# Patient Record
Sex: MALE | Race: BLACK OR AFRICAN AMERICAN | Employment: OTHER | URBAN - METROPOLITAN AREA
[De-identification: names, ages, dates, MRNs, and addresses within clinical notes are randomized per-mention and may not be internally consistent; named-entity substitution may affect disease eponyms.]

---

## 2022-04-15 ENCOUNTER — TRANSCRIBE ORDER (OUTPATIENT)
Dept: SCHEDULING | Age: 62
End: 2022-04-15

## 2022-04-15 DIAGNOSIS — R93.89 ABNORMAL RADIOLOGICAL FINDINGS IN SKIN AND SUBCUTANEOUS TISSUE: Primary | ICD-10-CM

## 2022-04-26 ENCOUNTER — HOSPITAL ENCOUNTER (OUTPATIENT)
Dept: CT IMAGING | Age: 62
Discharge: HOME | End: 2022-04-26
Attending: FAMILY MEDICINE

## 2022-04-26 DIAGNOSIS — R93.89 ABNORMAL RADIOLOGICAL FINDINGS IN SKIN AND SUBCUTANEOUS TISSUE: ICD-10-CM

## 2022-04-26 RX ORDER — SODIUM CHLORIDE 0.9 % (FLUSH) 0.9 %
10 SYRINGE (ML) INJECTION
Status: COMPLETED | OUTPATIENT
Start: 2022-04-26 | End: 2022-04-26

## 2022-04-26 RX ADMIN — Medication 10 ML: at 10:04

## 2022-06-03 ENCOUNTER — OFFICE VISIT (OUTPATIENT)
Dept: ENT CLINIC | Age: 62
End: 2022-06-03
Payer: OTHER GOVERNMENT

## 2022-06-03 VITALS — HEIGHT: 72 IN | RESPIRATION RATE: 22 BRPM | BODY MASS INDEX: 42.66 KG/M2 | WEIGHT: 315 LBS

## 2022-06-03 DIAGNOSIS — J32.8 OTHER CHRONIC SINUSITIS: Primary | ICD-10-CM

## 2022-06-03 DIAGNOSIS — R09.81 NASAL CONGESTION: ICD-10-CM

## 2022-06-03 PROCEDURE — 31231 NASAL ENDOSCOPY DX: CPT | Performed by: OTOLARYNGOLOGY

## 2022-06-03 PROCEDURE — 99204 OFFICE O/P NEW MOD 45 MIN: CPT | Performed by: OTOLARYNGOLOGY

## 2022-06-03 RX ORDER — POLYETHYLENE GLYCOL-3350 AND ELECTROLYTES 236; 6.74; 5.86; 2.97; 22.74 G/274.31G; G/274.31G; G/274.31G; G/274.31G; G/274.31G
POWDER, FOR SOLUTION ORAL
COMMUNITY
Start: 2022-05-11

## 2022-06-03 RX ORDER — TRAMADOL HYDROCHLORIDE 50 MG/1
TABLET ORAL
COMMUNITY
Start: 2022-04-11

## 2022-06-03 RX ORDER — DICLOFENAC SODIUM 75 MG/1
75 TABLET, DELAYED RELEASE ORAL
COMMUNITY

## 2022-06-03 RX ORDER — FINASTERIDE 5 MG/1
TABLET, FILM COATED ORAL
COMMUNITY
Start: 2022-03-17

## 2022-06-03 RX ORDER — TAMSULOSIN HYDROCHLORIDE 0.4 MG/1
CAPSULE ORAL
COMMUNITY
Start: 2022-03-17

## 2022-06-03 RX ORDER — METHOCARBAMOL 500 MG/1
TABLET, FILM COATED ORAL
COMMUNITY
Start: 2022-04-08

## 2022-06-03 RX ORDER — ATENOLOL AND CHLORTHALIDONE TABLET 100; 25 MG/1; MG/1
TABLET ORAL
COMMUNITY
Start: 2022-04-08

## 2022-06-03 RX ORDER — POTASSIUM CHLORIDE 750 MG/1
10 CAPSULE, EXTENDED RELEASE ORAL DAILY
COMMUNITY

## 2022-06-03 RX ORDER — ACETAMINOPHEN 500 MG
TABLET ORAL
COMMUNITY
Start: 2022-04-08

## 2022-06-03 RX ORDER — LINACLOTIDE 290 UG/1
CAPSULE, GELATIN COATED ORAL
COMMUNITY
Start: 2022-05-13

## 2022-06-03 RX ORDER — CARBOXYMETHYLCELLULOSE SODIUM 5 MG/ML
2 SOLUTION/ DROPS OPHTHALMIC 2 TIMES DAILY
COMMUNITY

## 2022-06-03 ASSESSMENT — ENCOUNTER SYMPTOMS
ABDOMINAL PAIN: 0
SHORTNESS OF BREATH: 0

## 2022-06-03 NOTE — PROGRESS NOTES
Chief Complaint   Patient presents with    New Patient       Patient presents today with c/o sinus congestion that comes and goes . He states that this is not bothersome.          HPI:  Verona Armendariz is a 59 y.o. male seen today in initial consultation for incidental findings of sinusitis on imaging that was performed to evaluate neck pain. He had an MRI followed by a CT which revealed patchy mucosal thickening of the bilateral maxillary, ethmoids, inferior frontal sinuses. He denies specific symptoms relating to his sinuses. Denies sinus pain/pressure/mucopurulent rhinorrhea/anosmia. He notes mild nasal obstruction at night when he sleeps that varies from side to side. No history of nasal / sinus surgery.         Past Medical History, Past Surgical History, Family history, Social History, and Medications were all reviewed with the patient today and updated as necessary.      No Known Allergies      Patient Active Problem List   Diagnosis    Elevated PSA           Current Outpatient Medications   Medication Sig    atenolol-chlorthalidone (TENORETIC) 100-25 MG per tablet      acetaminophen (TYLENOL) 500 MG tablet      carboxymethylcellulose (REFRESH PLUS) 0.5 % SOLN ophthalmic solution 2 drops 2 times daily    diclofenac (VOLTAREN) 75 MG EC tablet Take 75 mg by mouth    finasteride (PROSCAR) 5 MG tablet      LINZESS 290 MCG CAPS capsule TAKE 1 CAPSULE (290 MCG) DAILY    methocarbamol (ROBAXIN) 500 MG tablet      GAVILYTE-G 236 g solution TAKE 4,000 ML BY MOUTH ONCE FOR 1 DOSE.     potassium chloride (MICRO-K) 10 MEQ extended release capsule Take 10 mEq by mouth daily    tamsulosin (FLOMAX) 0.4 mg capsule      traMADol (ULTRAM) 50 MG tablet        No current facility-administered medications for this visit.      Past Medical History   Past Medical History:   Diagnosis Date    Elevated PSA           Social History            Tobacco Use    Smoking status: Never Smoker    Smokeless tobacco: Never Used   Substance Use Topics    Alcohol use: Yes       Alcohol/week: 0.0 standard drinks       Comment: rare use       Past Surgical History   History reviewed. No pertinent surgical history. Family History         Family History   Problem Relation Age of Onset    Cancer Mother      Heart Disease Father              ROS:     Review of Systems   Constitutional: Negative for fever. HENT: Positive for congestion. Eyes: Negative for visual disturbance. Respiratory: Negative for shortness of breath. Cardiovascular: Negative for chest pain. Gastrointestinal: Negative for abdominal pain. Endocrine: Negative for cold intolerance. Genitourinary: Negative for difficulty urinating. Musculoskeletal: Negative for arthralgias. Skin: Negative for rash. Allergic/Immunologic: Negative for environmental allergies. Neurological: Negative for dizziness and headaches. Hematological: Negative for adenopathy. Psychiatric/Behavioral: Negative for agitation.         PHYSICAL EXAM:     Resp 22   Ht 6' (1.829 m)   Wt (!) 404 lb (183.3 kg)   BMI 54.79 kg/m²      General: NAD, well-appearing. Ambulates with walker  Neuro: No gross neuro deficits. CN's II-XII intact. No facial weakness. Eyes: EOMI. Pupils reactive. No periorbital edema/ecchymosis. No nystagmus. Skin: No facial erythema, rashes or concerning lesions. Nose: No external deviations or saddling. Intranasally, septum is midline without perforations, nasal mucosa appears healthy with no erythema, mucopurulence, or polyps. right inferior turbinate hypertrophy. Mouth: Moist mucus membranes, normal tongue/palate mobility, no concerning mucosal lesions. Oropharynx clear with no erythema/exudate, no tonsillar hypertrophy. Ears: Normal appearing auricles, no hematomas. EACs clear with no cerumen impaction, healthy canal skin, TM's intact with no perforations or retraction pockets. No middle ear effusions.   Neck: Soft, supple, no palpable lateral neck masses. No palpable parotid or submandibular masses. No thyromegaly or palpable thyroid nodules. No surgical scars. Lymphatics: No palpable cervical LAD. Resp: No audible stridor or wheezing. Extremities: No clubbing or cyanosis.           PROCEDURE:  NASAL ENDOSCOPY  The fiberoptic scope is inserted through the naris and the structures of the bilateral nasal cavity, septum, inferior turbinates, middle meatus, superior meatus, sphenoethmoid recess, and nasopharynx are visualized. All of the visualized structures are within normal limits with the following exceptions:    Mild edema and erythema of the nasal mucosa. No mucopurulence or polyps visualized. CT Neck 4/26/22:  Images reviewed with focus on sinuses - patchy mucosal thickening of bilateral maxillary/ethmoid/inferior frontal sinuses.         ASSESSMENT and PLAN     Chronic sinusitis. His imaging findings and exam are suggestive of chronic sinusitis. No signs of acute sinus infections at this time. He is essentially asymptomatic with respect to the sinuses. Discussed medical management with saline irrigations and provided instructions. Will Rx fluticasone nasal spray and discussed proper administration. He will follow up in 2 months with Dr. Cass Farley for re-evaluation.

## 2022-06-03 NOTE — PROGRESS NOTES
Chief Complaint   Patient presents with    New Patient     Patient presents today with c/o sinus congestion that comes and goes . He states that this is not bothersome. HPI:  Vanda Eddy is a 59 y.o. male seen today in initial consultation for       Past Medical History, Past Surgical History, Family history, Social History, and Medications were all reviewed with the patient today and updated as necessary. No Known Allergies  Patient Active Problem List   Diagnosis    Elevated PSA     Current Outpatient Medications   Medication Sig    atenolol-chlorthalidone (TENORETIC) 100-25 MG per tablet     acetaminophen (TYLENOL) 500 MG tablet     carboxymethylcellulose (REFRESH PLUS) 0.5 % SOLN ophthalmic solution 2 drops 2 times daily    diclofenac (VOLTAREN) 75 MG EC tablet Take 75 mg by mouth    finasteride (PROSCAR) 5 MG tablet     LINZESS 290 MCG CAPS capsule TAKE 1 CAPSULE (290 MCG) DAILY    methocarbamol (ROBAXIN) 500 MG tablet     GAVILYTE-G 236 g solution TAKE 4,000 ML BY MOUTH ONCE FOR 1 DOSE.  potassium chloride (MICRO-K) 10 MEQ extended release capsule Take 10 mEq by mouth daily    tamsulosin (FLOMAX) 0.4 mg capsule     traMADol (ULTRAM) 50 MG tablet      No current facility-administered medications for this visit. Past Medical History:   Diagnosis Date    Elevated PSA      Social History     Tobacco Use    Smoking status: Never Smoker    Smokeless tobacco: Never Used   Substance Use Topics    Alcohol use: Yes     Alcohol/week: 0.0 standard drinks     Comment: rare use      History reviewed. No pertinent surgical history. Family History   Problem Relation Age of Onset    Cancer Mother     Heart Disease Father         ROS:    Review of Systems   Constitutional: Negative for fever. HENT: Positive for congestion. Eyes: Negative for visual disturbance. Respiratory: Negative for shortness of breath. Cardiovascular: Negative for chest pain.    Gastrointestinal: Negative for abdominal pain. Endocrine: Negative for cold intolerance. Genitourinary: Negative for difficulty urinating. Musculoskeletal: Negative for arthralgias. Skin: Negative for rash. Allergic/Immunologic: Negative for environmental allergies. Neurological: Negative for dizziness and headaches. Hematological: Negative for adenopathy. Psychiatric/Behavioral: Negative for agitation. PHYSICAL EXAM:    Resp 22   Ht 6' (1.829 m)   Wt (!) 404 lb (183.3 kg)   BMI 54.79 kg/m²     General: NAD, well-appearing  Neuro: No gross neuro deficits. CN's II-XII intact. No facial weakness. Eyes: EOMI. Pupils reactive. No periorbital edema/ecchymosis. No nystagmus. Skin: No facial erythema, rashes or concerning lesions. Nose: No external deviations or saddling. Intranasally, septum is midline without perforations, nasal mucosa appears healthy with no erythema, mucopurulence, or polyps. Mouth: Moist mucus membranes, normal tongue/palate mobility, no concerning mucosal lesions. Oropharynx clear with no erythema/exudate, no tonsillar hypertrophy. Ears: Normal appearing auricles, no hematomas. EACs clear with no cerumen impaction, healthy canal skin, TM's intact with no perforations or retraction pockets. No middle ear effusions. Neck: Soft, supple, no palpable lateral neck masses. No palpable parotid or submandibular masses. No thyromegaly or palpable thyroid nodules. No surgical scars. Lymphatics: No palpable cervical LAD. Resp: No audible stridor or wheezing. CV: No murmurs, no JVD. Extremities: No clubbing or cyanosis. ASSESSMENT and PLAN    No diagnosis found.         Kandace Yi, [address] [place]  6/3/2022    Electronically signed by Kandace Yi MA on 6/3/2022 at 8:41 AM

## 2022-08-30 ENCOUNTER — OFFICE VISIT (OUTPATIENT)
Dept: ENT CLINIC | Age: 62
End: 2022-08-30
Payer: OTHER GOVERNMENT

## 2022-08-30 DIAGNOSIS — J32.9 CHRONIC RHINOSINUSITIS: Primary | ICD-10-CM

## 2022-08-30 DIAGNOSIS — J31.0 CHRONIC RHINOSINUSITIS: Primary | ICD-10-CM

## 2022-08-30 PROCEDURE — 99213 OFFICE O/P EST LOW 20 MIN: CPT | Performed by: OTOLARYNGOLOGY

## 2022-08-30 RX ORDER — POTASSIUM CHLORIDE 750 MG/1
TABLET, EXTENDED RELEASE ORAL
COMMUNITY
Start: 2022-08-01

## 2022-08-30 RX ORDER — LIDOCAINE 50 MG/G
OINTMENT TOPICAL
COMMUNITY
Start: 2022-08-16

## 2022-08-30 RX ORDER — HYLAN G-F 20 16MG/2ML
SYRINGE (ML) INTRAARTICULAR
COMMUNITY
Start: 2022-07-25

## 2022-08-30 ASSESSMENT — ENCOUNTER SYMPTOMS
DIARRHEA: 0
COLOR CHANGE: 0
VOMITING: 0
WHEEZING: 0
EYE PAIN: 0
COUGH: 0

## 2022-08-30 NOTE — PROGRESS NOTES
Chief Complaint   Patient presents with    Follow-up     Patient is here for his follow up and states that he is doing well. HPI:  Avtar Villanueva is a 59 y.o. male seen in follow-up for his sinuses. He had seen my colleague Dr. Mike Keita earlier this summer for CRS which was noted on a MRI and then CT scan. He has started NSIs since that visit and has been doing well. He did not start nasal steroids. He is breathing fairly well- he usually reports alternating congestion in the winter but he has done well this past summer. He has been irrigating his sinuses daily and the irrigate has been clear- he denies any freq nose blowing. There is no significant facial pain/pressure or sinus HA. Past Medical History, Past Surgical History, Family history, Social History, and Medications were all reviewed with the patient today and updated as necessary. No Known Allergies  Patient Active Problem List   Diagnosis    Elevated PSA     Current Outpatient Medications   Medication Sig    KLOR-CON M10 10 MEQ extended release tablet     SYNVISC ONE 48 MG/6ML injection     lidocaine (XYLOCAINE) 5 % ointment     atenolol-chlorthalidone (TENORETIC) 100-25 MG per tablet     acetaminophen (TYLENOL) 500 MG tablet     carboxymethylcellulose (REFRESH PLUS) 0.5 % SOLN ophthalmic solution 2 drops 2 times daily    diclofenac (VOLTAREN) 75 MG EC tablet Take 75 mg by mouth    finasteride (PROSCAR) 5 MG tablet     LINZESS 290 MCG CAPS capsule TAKE 1 CAPSULE (290 MCG) DAILY    methocarbamol (ROBAXIN) 500 MG tablet     GAVILYTE-G 236 g solution TAKE 4,000 ML BY MOUTH ONCE FOR 1 DOSE.    potassium chloride (MICRO-K) 10 MEQ extended release capsule Take 10 mEq by mouth daily    tamsulosin (FLOMAX) 0.4 mg capsule     traMADol (ULTRAM) 50 MG tablet      No current facility-administered medications for this visit.      Past Medical History:   Diagnosis Date    Elevated PSA      Social History     Tobacco Use    Smoking status: Never    Smokeless tobacco: Never   Substance Use Topics    Alcohol use: Yes     Alcohol/week: 0.0 standard drinks     Comment: rare use      No past surgical history on file. Family History   Problem Relation Age of Onset    Cancer Mother     Heart Disease Father         ROS:    Review of Systems   Constitutional:  Negative for chills. Eyes:  Negative for pain. Respiratory:  Negative for cough and wheezing. Cardiovascular:  Negative for chest pain and palpitations. Gastrointestinal:  Negative for diarrhea and vomiting. Skin:  Negative for color change and wound. Allergic/Immunologic: Negative for environmental allergies. Neurological:  Negative for dizziness and headaches. PHYSICAL EXAM:    There were no vitals taken for this visit. General: NAD, well-appearing, comes in via walker  Neuro: No gross neuro deficits. No facial weakness. Eyes: No periorbital edema/ecchymosis. No nystagmus. Skin: No facial erythema, rashes or concerning lesions. Nose: No external deviations or saddling. Intranasally, septum is midline without perforations, nasal mucosa appears healthy with no erythema, mucopurulence, or polyps. Mouth: Moist mucus membranes, normal tongue/palate mobility, no concerning mucosal lesions. Oropharynx clear with no erythema/exudate, no tonsillar hypertrophy. Ears: Normal appearing auricles, no hematomas. EACs clear with no cerumen impaction, healthy canal skin, TM's intact with no perforations or retraction pockets. No middle ear effusions. Neck: Soft, supple, no palpable neck masses. No palpable parotid or submandibular masses. No thyromegaly or palpable thyroid nodules. Lymphatics: No palpable cervical LAD. Resp: No audible stridor or wheezing. CV: No murmurs, no JVD. Extremities: No clubbing or cyanosis. ASSESSMENT and PLAN      ICD-10-CM    1. Chronic rhinosinusitis  J31.0     J32.9         He has done well this past summer on NSIs alone. Denies any current sinonasal sxs.  He will add Flonase for the fall/winter months and I will see him back PRN.     Diaz Abraham MD  8/30/2022    Electronically signed by Diaz Abraham MD on 8/30/2022 at 9:38 AM

## 2023-12-14 ENCOUNTER — OFFICE VISIT (OUTPATIENT)
Dept: ORTHOPEDIC SURGERY | Age: 63
End: 2023-12-14
Payer: OTHER GOVERNMENT

## 2023-12-14 VITALS — WEIGHT: 315 LBS | BODY MASS INDEX: 42.66 KG/M2 | HEIGHT: 72 IN

## 2023-12-14 DIAGNOSIS — G56.22 CUBITAL TUNNEL SYNDROME ON LEFT: Primary | ICD-10-CM

## 2023-12-14 PROCEDURE — 99204 OFFICE O/P NEW MOD 45 MIN: CPT | Performed by: ORTHOPAEDIC SURGERY

## 2023-12-14 PROCEDURE — 1123F ACP DISCUSS/DSCN MKR DOCD: CPT | Performed by: ORTHOPAEDIC SURGERY

## 2023-12-14 NOTE — PROGRESS NOTES
Orthopaedic Hand Surgery Note    Name: Karyn Roach  YOB: 1960  Gender: male  MRN: [id number]    CC: New patient referred for hand numbness      HPI: Patient is a 70 y.o. male with a chief complaint of right hand numbness and tingling in the ulnar nerve distribution. The symptoms have been going on for years. The patient does not complain of night wakening and increased symptoms with driving. Evaluation to date has included nerve conduction study. He has a history of neck pain, and has seen a spine surgeon. ROS/Meds/PSH/PMH/FH/SH: I personally reviewed the patients standard intake form. Pertinents are discussed In the HPI    Physical Examination:    Musculoskeletal:     Examination of the right upper extremity demonstrates Decreased sensation to light touch in the ulnar distribution, normal sensation in median and radial distribution, negative carpal tunnel compression testing and Phalen testing, cap refill < 5 seconds in all fingers. Inspection reveals no thenar atrophy, severe interosseous atrophy. equivocal Tinel and elbow flexion compression test of the cubital tunnel, negative Tinel over Guyon's canal. positive Wartenberg sign, mild ulnar clawing involving the small finger. No tenderness to palpation or masses noted in the forearm. Imaging / Electrodiagnostic Tests:     I have only reviewed and interpreted the patient nerve conduction study. He has findings consistent with bilateral cubital tunnel syndrome, worse on the right than the left. Bilateral ulnar sensory conduction is not recordable. Right median sensory conduction demonstrates mildly decreased amplitude with normal latency and conduction velocity.   Right median motor conduction is normal.  Left median motor conduction is normal.  Ulnar motor conduction demonstrates increased latency, diminished amplitude and decreased conduction velocity needle EMG demonstrates increase spontaneous activity and reduced recruitment at

## 2023-12-15 DIAGNOSIS — G56.21 CUBITAL TUNNEL SYNDROME ON RIGHT: Primary | ICD-10-CM
